# Patient Record
Sex: FEMALE | Race: WHITE | HISPANIC OR LATINO | Employment: UNEMPLOYED | URBAN - METROPOLITAN AREA
[De-identification: names, ages, dates, MRNs, and addresses within clinical notes are randomized per-mention and may not be internally consistent; named-entity substitution may affect disease eponyms.]

---

## 2022-12-29 ENCOUNTER — INFUSION (OUTPATIENT)
Dept: INFUSION THERAPY | Facility: HOSPITAL | Age: 22
End: 2022-12-29
Attending: OBSTETRICS & GYNECOLOGY
Payer: MEDICAID

## 2022-12-29 VITALS
BODY MASS INDEX: 26.57 KG/M2 | HEIGHT: 63 IN | SYSTOLIC BLOOD PRESSURE: 135 MMHG | DIASTOLIC BLOOD PRESSURE: 73 MMHG | TEMPERATURE: 98 F | OXYGEN SATURATION: 98 % | WEIGHT: 149.94 LBS | RESPIRATION RATE: 18 BRPM | HEART RATE: 90 BPM

## 2022-12-29 DIAGNOSIS — Z23 NEED FOR HPV VACCINE: Primary | ICD-10-CM

## 2022-12-29 DIAGNOSIS — Z23 NEED FOR HPV VACCINE: ICD-10-CM

## 2022-12-29 PROCEDURE — 63600175 PHARM REV CODE 636 W HCPCS: Performed by: OBSTETRICS & GYNECOLOGY

## 2022-12-29 PROCEDURE — 90651 9VHPV VACCINE 2/3 DOSE IM: CPT | Performed by: OBSTETRICS & GYNECOLOGY

## 2022-12-29 PROCEDURE — 90471 IMMUNIZATION ADMIN: CPT | Performed by: OBSTETRICS & GYNECOLOGY

## 2022-12-29 PROCEDURE — 96372 THER/PROPH/DIAG INJ SC/IM: CPT

## 2022-12-29 RX ADMIN — HUMAN PAPILLOMAVIRUS 9-VALENT VACCINE, RECOMBINANT 0.5 ML: 30; 40; 60; 40; 20; 20; 20; 20; 20 INJECTION, SUSPENSION INTRAMUSCULAR at 11:12

## 2023-02-23 ENCOUNTER — INFUSION (OUTPATIENT)
Dept: INFUSION THERAPY | Facility: HOSPITAL | Age: 23
End: 2023-02-23
Attending: OBSTETRICS & GYNECOLOGY
Payer: MEDICAID

## 2023-02-23 VITALS
TEMPERATURE: 99 F | WEIGHT: 149.94 LBS | HEIGHT: 63 IN | RESPIRATION RATE: 18 BRPM | DIASTOLIC BLOOD PRESSURE: 68 MMHG | SYSTOLIC BLOOD PRESSURE: 120 MMHG | OXYGEN SATURATION: 100 % | HEART RATE: 73 BPM | BODY MASS INDEX: 26.57 KG/M2

## 2023-02-23 DIAGNOSIS — Z23 NEED FOR HPV VACCINE: Primary | ICD-10-CM

## 2023-02-23 PROCEDURE — 90471 IMMUNIZATION ADMIN: CPT | Performed by: OBSTETRICS & GYNECOLOGY

## 2023-02-23 PROCEDURE — 90651 9VHPV VACCINE 2/3 DOSE IM: CPT | Performed by: OBSTETRICS & GYNECOLOGY

## 2023-02-23 PROCEDURE — 96372 THER/PROPH/DIAG INJ SC/IM: CPT

## 2023-02-23 PROCEDURE — 63600175 PHARM REV CODE 636 W HCPCS: Performed by: OBSTETRICS & GYNECOLOGY

## 2023-02-23 RX ADMIN — HUMAN PAPILLOMAVIRUS 9-VALENT VACCINE, RECOMBINANT 0.5 ML: 30; 40; 60; 40; 20; 20; 20; 20; 20 INJECTION, SUSPENSION INTRAMUSCULAR at 11:02

## 2023-04-21 ENCOUNTER — HOSPITAL ENCOUNTER (EMERGENCY)
Facility: HOSPITAL | Age: 23
Discharge: HOME | End: 2023-04-21
Attending: INTERNAL MEDICINE
Payer: MEDICAID

## 2023-04-21 VITALS
TEMPERATURE: 99 F | WEIGHT: 152.88 LBS | SYSTOLIC BLOOD PRESSURE: 125 MMHG | RESPIRATION RATE: 18 BRPM | HEART RATE: 84 BPM | OXYGEN SATURATION: 100 % | HEIGHT: 65 IN | BODY MASS INDEX: 25.47 KG/M2 | DIASTOLIC BLOOD PRESSURE: 81 MMHG

## 2023-04-21 DIAGNOSIS — N83.299 OVARIAN CYST, COMPLEX: Primary | ICD-10-CM

## 2023-04-21 DIAGNOSIS — N93.8 DYSFUNCTIONAL UTERINE BLEEDING: ICD-10-CM

## 2023-04-21 LAB
ALBUMIN SERPL-MCNC: 4.3 G/DL (ref 3.5–5)
ALBUMIN/GLOB SERPL: 1.4 RATIO (ref 1.1–2)
ALP SERPL-CCNC: 82 UNIT/L (ref 40–150)
ALT SERPL-CCNC: 15 UNIT/L (ref 0–55)
APPEARANCE UR: CLEAR
AST SERPL-CCNC: 13 UNIT/L (ref 5–34)
B-HCG UR QL: NEGATIVE
BACTERIA #/AREA URNS AUTO: ABNORMAL /HPF
BASOPHILS # BLD AUTO: 0.03 X10(3)/MCL (ref 0–0.2)
BASOPHILS NFR BLD AUTO: 0.3 %
BILIRUB UR QL STRIP.AUTO: NEGATIVE MG/DL
BILIRUBIN DIRECT+TOT PNL SERPL-MCNC: 0.2 MG/DL
BUN SERPL-MCNC: 9.3 MG/DL (ref 7–18.7)
C TRACH DNA SPEC QL NAA+PROBE: NOT DETECTED
CALCIUM SERPL-MCNC: 9.1 MG/DL (ref 8.4–10.2)
CHLORIDE SERPL-SCNC: 107 MMOL/L (ref 98–107)
CO2 SERPL-SCNC: 25 MMOL/L (ref 22–29)
COLOR UR AUTO: ABNORMAL
CREAT SERPL-MCNC: 0.71 MG/DL (ref 0.55–1.02)
CTP QC/QA: YES
EOSINOPHIL # BLD AUTO: 0.12 X10(3)/MCL (ref 0–0.9)
EOSINOPHIL NFR BLD AUTO: 1.2 %
ERYTHROCYTE [DISTWIDTH] IN BLOOD BY AUTOMATED COUNT: 13.2 % (ref 11.5–17)
GFR SERPLBLD CREATININE-BSD FMLA CKD-EPI: >60 MLS/MIN/1.73/M2
GLOBULIN SER-MCNC: 3.1 GM/DL (ref 2.4–3.5)
GLUCOSE SERPL-MCNC: 84 MG/DL (ref 74–100)
GLUCOSE UR QL STRIP.AUTO: NORMAL MG/DL
HCT VFR BLD AUTO: 38.3 % (ref 37–47)
HGB BLD-MCNC: 12.8 G/DL (ref 12–16)
HYALINE CASTS #/AREA URNS LPF: ABNORMAL /LPF
IMM GRANULOCYTES # BLD AUTO: 0.04 X10(3)/MCL (ref 0–0.04)
IMM GRANULOCYTES NFR BLD AUTO: 0.4 %
KETONES UR QL STRIP.AUTO: NEGATIVE MG/DL
LEUKOCYTE ESTERASE UR QL STRIP.AUTO: 25 UNIT/L
LYMPHOCYTES # BLD AUTO: 3.15 X10(3)/MCL (ref 0.6–4.6)
LYMPHOCYTES NFR BLD AUTO: 30.8 %
MCH RBC QN AUTO: 28.8 PG (ref 27–31)
MCHC RBC AUTO-ENTMCNC: 33.4 G/DL (ref 33–36)
MCV RBC AUTO: 86.1 FL (ref 80–94)
MONOCYTES # BLD AUTO: 0.73 X10(3)/MCL (ref 0.1–1.3)
MONOCYTES NFR BLD AUTO: 7.1 %
MUCOUS THREADS URNS QL MICRO: ABNORMAL /LPF
N GONORRHOEA DNA SPEC QL NAA+PROBE: NOT DETECTED
NEUTROPHILS # BLD AUTO: 6.16 X10(3)/MCL (ref 2.1–9.2)
NEUTROPHILS NFR BLD AUTO: 60.2 %
NITRITE UR QL STRIP.AUTO: NEGATIVE
NRBC BLD AUTO-RTO: 0 %
PH UR STRIP.AUTO: 6.5 [PH]
PLATELET # BLD AUTO: 142 X10(3)/MCL (ref 130–400)
PMV BLD AUTO: 12.4 FL (ref 7.4–10.4)
POTASSIUM SERPL-SCNC: 3.8 MMOL/L (ref 3.5–5.1)
PROT SERPL-MCNC: 7.4 GM/DL (ref 6.4–8.3)
PROT UR QL STRIP.AUTO: ABNORMAL MG/DL
RBC # BLD AUTO: 4.45 X10(6)/MCL (ref 4.2–5.4)
RBC #/AREA URNS AUTO: ABNORMAL /HPF
RBC UR QL AUTO: ABNORMAL UNIT/L
SODIUM SERPL-SCNC: 139 MMOL/L (ref 136–145)
SP GR UR STRIP.AUTO: 1.03
SQUAMOUS #/AREA URNS LPF: ABNORMAL /HPF
UROBILINOGEN UR STRIP-ACNC: NORMAL MG/DL
WBC # SPEC AUTO: 10.2 X10(3)/MCL (ref 4.5–11.5)
WBC #/AREA URNS AUTO: ABNORMAL /HPF

## 2023-04-21 PROCEDURE — 87591 N.GONORRHOEAE DNA AMP PROB: CPT | Performed by: PHYSICIAN ASSISTANT

## 2023-04-21 PROCEDURE — 80053 COMPREHEN METABOLIC PANEL: CPT | Performed by: PHYSICIAN ASSISTANT

## 2023-04-21 PROCEDURE — 85025 COMPLETE CBC W/AUTO DIFF WBC: CPT | Performed by: PHYSICIAN ASSISTANT

## 2023-04-21 PROCEDURE — 96372 THER/PROPH/DIAG INJ SC/IM: CPT | Performed by: PHYSICIAN ASSISTANT

## 2023-04-21 PROCEDURE — 99285 EMERGENCY DEPT VISIT HI MDM: CPT | Mod: 25

## 2023-04-21 PROCEDURE — 81025 URINE PREGNANCY TEST: CPT | Performed by: PHYSICIAN ASSISTANT

## 2023-04-21 PROCEDURE — 63600175 PHARM REV CODE 636 W HCPCS: Performed by: PHYSICIAN ASSISTANT

## 2023-04-21 PROCEDURE — 81001 URINALYSIS AUTO W/SCOPE: CPT | Performed by: PHYSICIAN ASSISTANT

## 2023-04-21 RX ORDER — KETOROLAC TROMETHAMINE 30 MG/ML
30 INJECTION, SOLUTION INTRAMUSCULAR; INTRAVENOUS
Status: COMPLETED | OUTPATIENT
Start: 2023-04-21 | End: 2023-04-21

## 2023-04-21 RX ORDER — IBUPROFEN 800 MG/1
800 TABLET ORAL 3 TIMES DAILY PRN
Qty: 30 TABLET | Refills: 0 | Status: SHIPPED | OUTPATIENT
Start: 2023-04-21

## 2023-04-21 RX ADMIN — KETOROLAC TROMETHAMINE 30 MG: 30 INJECTION, SOLUTION INTRAMUSCULAR; INTRAVENOUS at 02:04

## 2023-04-21 NOTE — Clinical Note
"William Fishia" Rangel was seen and treated in our emergency department on 4/21/2023.  She may return to work on 04/22/2023.       If you have any questions or concerns, please don't hesitate to call.      VINCENT Bourgeois RN    "

## 2023-04-21 NOTE — DISCHARGE INSTRUCTIONS
[place] los medicamentos según lo prescrito con alimentos y agua según sea necesario para el dolor.    Resultados de la ecografía: el DIU se encuentra a lo richa del canal endometrial.    Quiste levemente complejo de 4,2 cm en el ovario derecho    Se recomienda un seguimiento de 6 a 12 semanas con ecografía pélvica para garantizar la resolución.    Llame a davis obstetra ginecólogo hoy e infórmele sobre el diagnóstico. Jocelyne un seguimiento con ellos según davis recomendación.    Regrese de inmediato con cualquier empeoramiento o síntomas preocupantes.    Take medication as prescribed with food and water as needed for pain.     US results:IUD lies along endometrial canal.    4.2 cm mildly complex cyst in the right ovary     6-12 week follow up with pelvic US recommended to ensure resolution.      Call your OB GYN today and inform them of diagnosis.  Follow up with them per their recommendation.      Return immediately with any worsening or concerning symptoms.

## 2023-04-21 NOTE — ED PROVIDER NOTES
Encounter Date: 4/21/2023       History     Chief Complaint   Patient presents with    Vaginal Bleeding     IUD placed 30 days ago, has been bleeding x 10 days with abd pain and cramping.       Patient is a 22 year old female who presents to the emergency department with complaints of vaginal bleeding x 10 days with bi lower abdominal pain/cramping.   IUD placed 30 days ago, with intermittent vaginal bleeding that began 5 days after placement.  She states bleeding is mild, only using 1 panty liner daily.  She reports the pain to be bilateral (worse on right), intermittent (lasting about 15 minutes) with the most pain at night.  She rates the pain 10/10 and takes ibuprofen occasionally with some relief.  Her OB GYN is Dr. Blayne Parra in [place].  She denies fever, chills, vaginal discharge, dizziness, vision changes, headache, shortness of breath, chest pain, constipation, nausea, vomiting.    The history is provided by the patient. A  was used.   Vaginal Bleeding  This is a new problem. Episode onset: 10 days ago. The problem has not changed since onset.Associated symptoms include abdominal pain (bi lower). Pertinent negatives include no chest pain, no headaches and no shortness of breath. Nothing aggravates the symptoms. The symptoms are relieved by NSAIDs.   Review of patient's allergies indicates:  No Known Allergies  No past medical history on file.  Past Surgical History:   Procedure Laterality Date    TONSILLECTOMY       No family history on file.  Social History     Tobacco Use    Smoking status: Never    Smokeless tobacco: Never     Review of Systems   Constitutional:  Negative for chills and fever.   HENT: Negative.     Eyes: Negative.    Respiratory:  Negative for cough, chest tightness and shortness of breath.    Cardiovascular:  Negative for chest pain, palpitations and leg swelling.   Gastrointestinal:  Positive for abdominal pain (bi lower). Negative for diarrhea, nausea and  vomiting.   Genitourinary:  Positive for vaginal bleeding. Negative for decreased urine volume, difficulty urinating, dysuria, flank pain, hematuria and vaginal discharge.   Musculoskeletal:  Negative for back pain.   Skin:  Negative for rash and wound.   Neurological:  Negative for dizziness, light-headedness and headaches.     Physical Exam     Initial Vitals [04/21/23 1048]   BP Pulse Resp Temp SpO2   125/81 84 18 98.8 °F (37.1 °C) 100 %      MAP       --         Physical Exam    Nursing note and vitals reviewed.  Constitutional: She appears well-developed and well-nourished.   HENT:   Head: Normocephalic and atraumatic.   Nose: Nose normal.   Mouth/Throat: Oropharynx is clear and moist.   Eyes: Conjunctivae are normal.   Neck: Neck supple.   Normal range of motion.  Cardiovascular:  Normal rate, regular rhythm, normal heart sounds and intact distal pulses.           Pulmonary/Chest: Breath sounds normal.   Abdominal: Abdomen is soft. Bowel sounds are normal. There is no abdominal tenderness. There is no rebound and no guarding.   Genitourinary:    Genitourinary Comments: Patient declined exam     Musculoskeletal:         General: Normal range of motion.      Cervical back: Normal range of motion and neck supple.     Neurological: She is alert.   Skin: Skin is warm. Capillary refill takes less than 2 seconds.       ED Course   Procedures  Labs Reviewed   URINALYSIS, REFLEX TO URINE CULTURE - Abnormal; Notable for the following components:       Result Value    Protein, UA Trace (*)     Blood, UA 3+ (*)     Leukocyte Esterase, UA 25 (*)     WBC, UA 6-10 (*)     Squamous Epithelial Cells, UA Occ (*)     Mucous, UA Trace (*)     RBC, UA 11-20 (*)     All other components within normal limits   CBC WITH DIFFERENTIAL - Abnormal; Notable for the following components:    MPV 12.4 (*)     All other components within normal limits   CHLAMYDIA/GONORRHOEAE(GC), PCR - Normal    Narrative:     The Xpert CT/NG test, performed  on the GeneXpert system is a qualitative in vitro real-time polymerase chain reaction (PCR) test for the automated detected and differentiation for genomic DNA from Chlamydia trachomatis (CT) and/or Neisseria gonorrhoeae (NG).   CBC W/ AUTO DIFFERENTIAL    Narrative:     The following orders were created for panel order CBC Auto Differential.  Procedure                               Abnormality         Status                     ---------                               -----------         ------                     CBC with Differential[[id number]]        Abnormal            Final result                 Please view results for these tests on the individual orders.   COMPREHENSIVE METABOLIC PANEL   POCT URINE PREGNANCY          Imaging Results              US Pelvis Comp with Transvag NON-OB (xpd (Final result)  Result time 04/21/23 13:26:10      Final result by Iglesia Cruz MD (04/21/23 13:26:10)                   Impression:      1. IUD lies along the endometrial canal.  2. 4.2 cm mildly complex cyst in the right ovary.  6-12 week follow-up pelvic ultrasound recommended to ensure resolution.      Electronically signed by: Iglesia Cruz  Date:    04/21/2023  Time:    13:26               Narrative:    EXAMINATION:  US PELVIS COMP WITH TRANSVAG NON-OB (XPD)    CLINICAL HISTORY:  heavy bleeding after placement of IUD, abdominal pain;    TECHNIQUE:  Transabdominal and endovaginal ultrasound of the pelvis.    COMPARISON:  No relevant comparison studies available at the time of dictation    FINDINGS:  Uterus has normal size with no defined myometrial lesion.  IUD identified along the endometrial canal.    Right ovary contains a 4.2 cm cyst which has 1 or 2 few possible peripheral septations.  Left ovary has normal appearance.  Blood flow documented within both ovaries.    No free fluid.    Measurements:    Uterus: 6.6 x 2.9 x 3.8 cm    Endometrium: 4 mm thick    Right ovary: 4.4 x 3.3 x 4.4 cm    Left ovary: 2.8 x 1.1 x  1.9 cm                                       Medications   ketorolac injection 30 mg (30 mg Intramuscular Given 4/21/23 8763)     Medical Decision Making:   Initial Assessment:   Patient is a 22 year old female who presents to the emergency department with complaints of vaginal bleeding x 10 days with bi lower abdominal pain/cramping.   Differential Diagnosis:   Ovarian cysts, ovarian torsion, UTI, STI, constipation  Clinical Tests:   Lab Tests: Reviewed and Ordered       <> Summary of Lab: Unremarkable  Radiological Study: Ordered and Reviewed  ED Management:  Medication given:  30 mg IM    US pelvis non OB:  IUD lies along the endometrial canal. 4.2 cm mildly complex cyst in the right ovary.  6-12 week follow-up pelvic ultrasound recommended to ensure resolution.    Patient will contact her OB GYN, Dr. Blayne Parra for inform of symptoms and findings and schedule follow up per their recommendation.  Gave strict ED precautions.      The patient is resting comfortably and in no acute distress.  She states that her symptoms have improved after treatment in Emergency Department. I personally discussed her test results and treatment plan.  Gave strict ED precautions, discussed specific conditions for return to the emergency department and importance of follow up with her primary care provider.  Patient voices understanding and agrees to the plan discussed. All patients' questions have been answered at this time.   She has remained hemodynamically stable throughout entire stay in ED and is stable for discharge home.             ED Course as of 04/24/23 0747   Fri Apr 21, 2023   1223 Leukocytes, UA(!): 25 [ER]   1223 WBC, UA(!): 6-10 [ER]   1338 US Pelvis Comp with Transvag NON-OB (xpd  1. IUD lies along the endometrial canal.  2. 4.2 cm mildly complex cyst in the right ovary.  6-12 week follow-up pelvic ultrasound recommended to ensure resolution. [ER]      ED Course User Index  [ER] ADONAY Echeverria                  Clinical Impression:   Final diagnoses:  [N83.299] Ovarian cyst, complex (Primary)  [N93.8] Dysfunctional uterine bleeding        ED Disposition Condition    Discharge Stable          ED Prescriptions       Medication Sig Dispense Start Date End Date Auth. Provider    ibuprofen (ADVIL,MOTRIN) 800 MG tablet Take 1 tablet (800 mg total) by mouth 3 (three) times daily as needed for Pain. 30 tablet 4/21/2023 -- ADONAY Echeverria          Follow-up Information       Follow up With Specialties Details Why Contact Info    [facility] Emergency Medicine  As needed, If symptoms worsen [address] [facility] [id number] [phone]             ADONAY Echeverria  04/24/23 0747       ADONAY Echeverria  04/24/23 0714

## 2023-05-01 ENCOUNTER — HOSPITAL ENCOUNTER (EMERGENCY)
Facility: HOSPITAL | Age: 23
Discharge: HOME | End: 2023-05-01
Attending: INTERNAL MEDICINE
Payer: MEDICAID

## 2023-05-01 VITALS
BODY MASS INDEX: 25.18 KG/M2 | DIASTOLIC BLOOD PRESSURE: 86 MMHG | OXYGEN SATURATION: 99 % | WEIGHT: 151.13 LBS | RESPIRATION RATE: 18 BRPM | TEMPERATURE: 98 F | SYSTOLIC BLOOD PRESSURE: 121 MMHG | HEART RATE: 96 BPM | HEIGHT: 65 IN

## 2023-05-01 DIAGNOSIS — T83.83XD: Primary | ICD-10-CM

## 2023-05-01 PROCEDURE — 99282 EMERGENCY DEPT VISIT SF MDM: CPT

## 2023-05-01 NOTE — Clinical Note
"William Fishia" Rangel was seen and treated in our emergency department on 5/1/2023.  She may return to work on 05/02/2023.       If you have any questions or concerns, please don't hesitate to call.      Dr. Cowan RN    "

## 2023-05-02 NOTE — ED PROVIDER NOTES
Encounter Date: 5/1/2023       History     Chief Complaint   Patient presents with    Vaginal Bleeding     Had IUD placed 2 months ago, today bleeding and abd pain      Presents for IUD removal.  was placed by Dr. Ny at [facility] 2 months ago but is making her hurt and bleed. She has an appointment with him on may 17.  was evaluated before in ED had an US reveal a small cyst in her ovary also. States using Ibuprofen for the pain    The history is provided by the patient.   Review of patient's allergies indicates:  No Known Allergies  No past medical history on file.  Past Surgical History:   Procedure Laterality Date    TONSILLECTOMY       No family history on file.  Social History     Tobacco Use    Smoking status: Never    Smokeless tobacco: Never     Review of Systems   Constitutional:  Negative for fever.   HENT:  Negative for sore throat.    Respiratory:  Negative for shortness of breath.    Cardiovascular:  Negative for chest pain.   Gastrointestinal:  Negative for nausea.   Genitourinary:  Positive for menstrual problem, pelvic pain and vaginal bleeding. Negative for dysuria.   Musculoskeletal:  Negative for back pain.   Skin:  Negative for rash.   Neurological:  Negative for weakness.   Hematological:  Does not bruise/bleed easily.   All other systems reviewed and are negative.    Physical Exam     Initial Vitals [05/01/23 2018]   BP Pulse Resp Temp SpO2   121/86 96 18 97.7 °F (36.5 °C) 99 %      MAP       --         Physical Exam    Nursing note and vitals reviewed.  Constitutional: She appears well-developed and well-nourished. No distress.   HENT:   Head: Normocephalic and atraumatic.   Mouth/Throat: Oropharynx is clear and moist.   Eyes: Conjunctivae are normal. Pupils are equal, round, and reactive to light.   Neck:   Normal range of motion.  Cardiovascular:  Normal rate, regular rhythm, normal heart sounds and intact distal pulses.           Pulmonary/Chest: Breath sounds normal.    Abdominal: Abdomen is soft. Bowel sounds are normal. She exhibits no distension. There is no abdominal tenderness. There is no rebound and no guarding.   Musculoskeletal:         General: No edema. Normal range of motion.      Cervical back: Normal range of motion.     Neurological: She is alert and oriented to person, place, and time. She has normal strength. GCS score is 15. GCS eye subscore is 4. GCS verbal subscore is 5. GCS motor subscore is 6.   Skin: Skin is warm and dry. No rash noted.   Psychiatric: Her behavior is normal.       ED Course   Procedures  Labs Reviewed - No data to display       Imaging Results    None          Medications - No data to display  Medical Decision Making:   History:   Old Medical Records: I decided to obtain old medical records.  Old Records Summarized: records from clinic visits.  Initial Assessment:   heavy bleeding after placement of IUD, abdominal pain;     TECHNIQUE:  Transabdominal and endovaginal ultrasound of the pelvis.     COMPARISON:  No relevant comparison studies available at the time of dictation     FINDINGS:  Uterus has normal size with no defined myometrial lesion.  IUD identified along the endometrial canal.     Right ovary contains a 4.2 cm cyst which has 1 or 2 few possible peripheral septations.  Left ovary has normal appearance.  Blood flow documented within both ovaries.     No free fluid.     Measurements:     Uterus: 6.6 x 2.9 x 3.8 cm     Endometrium: 4 mm thick     Right ovary: 4.4 x 3.3 x 4.4 cm     Left ovary: 2.8 x 1.1 x 1.9 cm     Impression:     1. IUD lies along the endometrial canal.  2. 4.2 cm mildly complex cyst in the right ovary.  6-12 week follow-up pelvic ultrasound recommended to ensure resolution.        Electronically signed by: Iglesia Cruz  Date:                                            04/21/2023  Time:                                           13:26                 8:24 PM    Pt states she does not want any workup, she thought  IUD could be removed in ED and she will not need to wait until her appointment. Requesting discharge       Clinical Impression:   Final diagnoses:  [T83.83XD] Hemorrhage due to intrauterine contraceptive device (IUD), subsequent encounter (Primary)        ED Disposition Condition    Discharge Stable          ED Prescriptions    None       Follow-up Information       Follow up With Specialties Details Why Contact Info    Geo Potter MD Geriatric Medicine In 2 weeks  [address] [facility] [address]  [phone]      [facility] Emergency Medicine  If symptoms worsen [facility] [facility] [id number] [phone]             Yusef Ambriz MD  05/01/23 2026

## 2023-05-18 DIAGNOSIS — Z01.818 OTHER SPECIFIED PRE-OPERATIVE EXAMINATION: Primary | ICD-10-CM

## 2023-05-22 ENCOUNTER — HOSPITAL ENCOUNTER (OUTPATIENT)
Dept: RADIOLOGY | Facility: HOSPITAL | Age: 23
Discharge: HOME | End: 2023-05-22
Attending: OBSTETRICS & GYNECOLOGY | Admitting: OBSTETRICS & GYNECOLOGY
Payer: MEDICAID

## 2023-05-22 DIAGNOSIS — Z01.818 OTHER SPECIFIED PRE-OPERATIVE EXAMINATION: ICD-10-CM

## 2023-05-22 PROCEDURE — 71046 X-RAY EXAM CHEST 2 VIEWS: CPT | Mod: TC

## 2023-05-24 ENCOUNTER — HOSPITAL ENCOUNTER (OUTPATIENT)
Facility: HOSPITAL | Age: 23
Discharge: HOME | End: 2023-05-24
Attending: OBSTETRICS & GYNECOLOGY | Admitting: OBSTETRICS & GYNECOLOGY
Payer: MEDICAID

## 2023-05-24 ENCOUNTER — ANESTHESIA (OUTPATIENT)
Dept: SURGERY | Facility: HOSPITAL | Age: 23
End: 2023-05-24
Payer: MEDICAID

## 2023-05-24 ENCOUNTER — ANESTHESIA EVENT (OUTPATIENT)
Dept: SURGERY | Facility: HOSPITAL | Age: 23
End: 2023-05-24
Payer: MEDICAID

## 2023-05-24 DIAGNOSIS — Z97.5 PRESENCE OF INTRACERVICAL CONTRACEPTIVE DEVICE: ICD-10-CM

## 2023-05-24 DIAGNOSIS — Z30.40 CONTRACEPTION, GENERIC SURVEILLANCE: ICD-10-CM

## 2023-05-24 LAB
B-HCG UR QL: NEGATIVE
CTP QC/QA: YES

## 2023-05-24 PROCEDURE — 37000008 HC ANESTHESIA 1ST 15 MINUTES: Performed by: OBSTETRICS & GYNECOLOGY

## 2023-05-24 PROCEDURE — 81025 URINE PREGNANCY TEST: CPT | Performed by: OBSTETRICS & GYNECOLOGY

## 2023-05-24 PROCEDURE — 25000003 PHARM REV CODE 250: Performed by: NURSE ANESTHETIST, CERTIFIED REGISTERED

## 2023-05-24 PROCEDURE — 36000707: Performed by: OBSTETRICS & GYNECOLOGY

## 2023-05-24 PROCEDURE — D9220A PRA ANESTHESIA: ICD-10-PCS | Mod: ,,, | Performed by: NURSE ANESTHETIST, CERTIFIED REGISTERED

## 2023-05-24 PROCEDURE — 88304 TISSUE EXAM BY PATHOLOGIST: CPT | Performed by: OBSTETRICS & GYNECOLOGY

## 2023-05-24 PROCEDURE — 36000706: Performed by: OBSTETRICS & GYNECOLOGY

## 2023-05-24 PROCEDURE — 71000033 HC RECOVERY, INTIAL HOUR: Performed by: OBSTETRICS & GYNECOLOGY

## 2023-05-24 PROCEDURE — 37000009 HC ANESTHESIA EA ADD 15 MINS: Performed by: OBSTETRICS & GYNECOLOGY

## 2023-05-24 PROCEDURE — D9220A PRA ANESTHESIA: Mod: ,,, | Performed by: NURSE ANESTHETIST, CERTIFIED REGISTERED

## 2023-05-24 PROCEDURE — 71000016 HC POSTOP RECOV ADDL HR: Performed by: OBSTETRICS & GYNECOLOGY

## 2023-05-24 PROCEDURE — 63600175 PHARM REV CODE 636 W HCPCS: Performed by: ANESTHESIOLOGY

## 2023-05-24 PROCEDURE — 25000003 PHARM REV CODE 250

## 2023-05-24 PROCEDURE — 63600175 PHARM REV CODE 636 W HCPCS: Performed by: NURSE ANESTHETIST, CERTIFIED REGISTERED

## 2023-05-24 PROCEDURE — 71000015 HC POSTOP RECOV 1ST HR: Performed by: OBSTETRICS & GYNECOLOGY

## 2023-05-24 PROCEDURE — 63600175 PHARM REV CODE 636 W HCPCS

## 2023-05-24 PROCEDURE — 25000003 PHARM REV CODE 250: Performed by: OBSTETRICS & GYNECOLOGY

## 2023-05-24 RX ORDER — HYDROCODONE BITARTRATE AND ACETAMINOPHEN 5; 325 MG/1; MG/1
1 TABLET ORAL EVERY 4 HOURS PRN
Status: DISCONTINUED | OUTPATIENT
Start: 2023-05-24 | End: 2023-05-24 | Stop reason: HOSPADM

## 2023-05-24 RX ORDER — SODIUM CHLORIDE, SODIUM GLUCONATE, SODIUM ACETATE, POTASSIUM CHLORIDE AND MAGNESIUM CHLORIDE 30; 37; 368; 526; 502 MG/100ML; MG/100ML; MG/100ML; MG/100ML; MG/100ML
INJECTION, SOLUTION INTRAVENOUS CONTINUOUS
Status: DISCONTINUED | OUTPATIENT
Start: 2023-05-24 | End: 2023-05-24 | Stop reason: HOSPADM

## 2023-05-24 RX ORDER — ONDANSETRON 2 MG/ML
4 INJECTION INTRAMUSCULAR; INTRAVENOUS DAILY PRN
Status: DISCONTINUED | OUTPATIENT
Start: 2023-05-24 | End: 2023-05-24 | Stop reason: HOSPADM

## 2023-05-24 RX ORDER — PROPOFOL 10 MG/ML
VIAL (ML) INTRAVENOUS
Status: DISCONTINUED | OUTPATIENT
Start: 2023-05-24 | End: 2023-05-24

## 2023-05-24 RX ORDER — HYDROMORPHONE HYDROCHLORIDE 2 MG/ML
0.2 INJECTION, SOLUTION INTRAMUSCULAR; INTRAVENOUS; SUBCUTANEOUS EVERY 5 MIN PRN
Status: DISCONTINUED | OUTPATIENT
Start: 2023-05-24 | End: 2023-05-24

## 2023-05-24 RX ORDER — SCOLOPAMINE TRANSDERMAL SYSTEM 1 MG/1
1 PATCH, EXTENDED RELEASE TRANSDERMAL
Status: DISCONTINUED | OUTPATIENT
Start: 2023-05-24 | End: 2023-05-24 | Stop reason: HOSPADM

## 2023-05-24 RX ORDER — LIDOCAINE HYDROCHLORIDE AND EPINEPHRINE 5; 5 MG/ML; UG/ML
INJECTION, SOLUTION INFILTRATION; PERINEURAL
Status: DISCONTINUED
Start: 2023-05-24 | End: 2023-05-24 | Stop reason: WASHOUT

## 2023-05-24 RX ORDER — PROCHLORPERAZINE EDISYLATE 5 MG/ML
5 INJECTION INTRAMUSCULAR; INTRAVENOUS EVERY 30 MIN PRN
Status: DISCONTINUED | OUTPATIENT
Start: 2023-05-24 | End: 2023-05-24 | Stop reason: HOSPADM

## 2023-05-24 RX ORDER — LIDOCAINE HYDROCHLORIDE 10 MG/ML
INJECTION, SOLUTION EPIDURAL; INFILTRATION; INTRACAUDAL; PERINEURAL
Status: DISCONTINUED | OUTPATIENT
Start: 2023-05-24 | End: 2023-05-24

## 2023-05-24 RX ORDER — HYDROMORPHONE HYDROCHLORIDE 2 MG/ML
0.4 INJECTION, SOLUTION INTRAMUSCULAR; INTRAVENOUS; SUBCUTANEOUS EVERY 5 MIN PRN
Status: DISCONTINUED | OUTPATIENT
Start: 2023-05-24 | End: 2023-05-24 | Stop reason: HOSPADM

## 2023-05-24 RX ORDER — FENTANYL CITRATE 50 UG/ML
INJECTION, SOLUTION INTRAMUSCULAR; INTRAVENOUS
Status: DISCONTINUED | OUTPATIENT
Start: 2023-05-24 | End: 2023-05-24

## 2023-05-24 RX ORDER — MEPERIDINE HYDROCHLORIDE 25 MG/ML
12.5 INJECTION INTRAMUSCULAR; INTRAVENOUS; SUBCUTANEOUS EVERY 10 MIN PRN
Status: DISCONTINUED | OUTPATIENT
Start: 2023-05-24 | End: 2023-05-24 | Stop reason: HOSPADM

## 2023-05-24 RX ORDER — LIDOCAINE HYDROCHLORIDE 10 MG/ML
1 INJECTION, SOLUTION EPIDURAL; INFILTRATION; INTRACAUDAL; PERINEURAL ONCE
Status: DISCONTINUED | OUTPATIENT
Start: 2023-05-24 | End: 2023-05-24 | Stop reason: HOSPADM

## 2023-05-24 RX ORDER — SODIUM CHLORIDE, SODIUM LACTATE, POTASSIUM CHLORIDE, CALCIUM CHLORIDE 600; 310; 30; 20 MG/100ML; MG/100ML; MG/100ML; MG/100ML
INJECTION, SOLUTION INTRAVENOUS CONTINUOUS
Status: DISCONTINUED | OUTPATIENT
Start: 2023-05-24 | End: 2023-05-24 | Stop reason: HOSPADM

## 2023-05-24 RX ORDER — SCOLOPAMINE TRANSDERMAL SYSTEM 1 MG/1
PATCH, EXTENDED RELEASE TRANSDERMAL
Status: DISCONTINUED
Start: 2023-05-24 | End: 2023-05-24 | Stop reason: HOSPADM

## 2023-05-24 RX ORDER — PROCHLORPERAZINE EDISYLATE 5 MG/ML
5 INJECTION INTRAMUSCULAR; INTRAVENOUS EVERY 6 HOURS PRN
Status: DISCONTINUED | OUTPATIENT
Start: 2023-05-24 | End: 2023-05-24 | Stop reason: HOSPADM

## 2023-05-24 RX ORDER — DIPHENHYDRAMINE HCL 25 MG
25 CAPSULE ORAL EVERY 4 HOURS PRN
Status: DISCONTINUED | OUTPATIENT
Start: 2023-05-24 | End: 2023-05-24 | Stop reason: HOSPADM

## 2023-05-24 RX ORDER — HYDROMORPHONE HYDROCHLORIDE 2 MG/ML
0.2 INJECTION, SOLUTION INTRAMUSCULAR; INTRAVENOUS; SUBCUTANEOUS EVERY 5 MIN PRN
Status: DISCONTINUED | OUTPATIENT
Start: 2023-05-24 | End: 2023-05-24 | Stop reason: HOSPADM

## 2023-05-24 RX ORDER — MUPIROCIN 20 MG/G
OINTMENT TOPICAL
Status: DISCONTINUED | OUTPATIENT
Start: 2023-05-24 | End: 2023-05-24 | Stop reason: HOSPADM

## 2023-05-24 RX ORDER — ACETAMINOPHEN 500 MG
TABLET ORAL
Status: COMPLETED
Start: 2023-05-24 | End: 2023-05-24

## 2023-05-24 RX ORDER — SILVER NITRATE 38.21; 12.74 MG/1; MG/1
STICK TOPICAL
Status: DISCONTINUED | OUTPATIENT
Start: 2023-05-24 | End: 2023-05-24 | Stop reason: HOSPADM

## 2023-05-24 RX ORDER — SODIUM CHLORIDE 9 MG/ML
INJECTION, SOLUTION INTRAVENOUS CONTINUOUS
Status: DISCONTINUED | OUTPATIENT
Start: 2023-05-24 | End: 2023-05-24 | Stop reason: HOSPADM

## 2023-05-24 RX ORDER — SODIUM CITRATE AND CITRIC ACID MONOHYDRATE 334; 500 MG/5ML; MG/5ML
30 SOLUTION ORAL ONCE
Status: COMPLETED | OUTPATIENT
Start: 2023-05-24 | End: 2023-05-24

## 2023-05-24 RX ORDER — ONDANSETRON 4 MG/1
8 TABLET, ORALLY DISINTEGRATING ORAL EVERY 8 HOURS PRN
Status: DISCONTINUED | OUTPATIENT
Start: 2023-05-24 | End: 2023-05-24 | Stop reason: HOSPADM

## 2023-05-24 RX ORDER — FAMOTIDINE 20 MG/1
20 TABLET, FILM COATED ORAL
Status: COMPLETED | OUTPATIENT
Start: 2023-05-24 | End: 2023-05-24

## 2023-05-24 RX ORDER — MIDAZOLAM HYDROCHLORIDE 1 MG/ML
INJECTION INTRAMUSCULAR; INTRAVENOUS
Status: COMPLETED
Start: 2023-05-24 | End: 2023-05-24

## 2023-05-24 RX ORDER — SILVER NITRATE 38.21; 12.74 MG/1; MG/1
STICK TOPICAL
Status: DISCONTINUED
Start: 2023-05-24 | End: 2023-05-24 | Stop reason: HOSPADM

## 2023-05-24 RX ORDER — IBUPROFEN 600 MG/1
600 TABLET ORAL EVERY 6 HOURS PRN
Status: DISCONTINUED | OUTPATIENT
Start: 2023-05-24 | End: 2023-05-24 | Stop reason: HOSPADM

## 2023-05-24 RX ORDER — ACETAMINOPHEN 500 MG
1000 TABLET ORAL
Status: COMPLETED | OUTPATIENT
Start: 2023-05-24 | End: 2023-05-24

## 2023-05-24 RX ORDER — DIPHENHYDRAMINE HYDROCHLORIDE 50 MG/ML
25 INJECTION INTRAMUSCULAR; INTRAVENOUS EVERY 6 HOURS PRN
Status: DISCONTINUED | OUTPATIENT
Start: 2023-05-24 | End: 2023-05-24 | Stop reason: HOSPADM

## 2023-05-24 RX ORDER — DIPHENHYDRAMINE HYDROCHLORIDE 50 MG/ML
25 INJECTION INTRAMUSCULAR; INTRAVENOUS EVERY 4 HOURS PRN
Status: DISCONTINUED | OUTPATIENT
Start: 2023-05-24 | End: 2023-05-24 | Stop reason: HOSPADM

## 2023-05-24 RX ORDER — SODIUM CITRATE AND CITRIC ACID MONOHYDRATE 334; 500 MG/5ML; MG/5ML
SOLUTION ORAL
Status: COMPLETED
Start: 2023-05-24 | End: 2023-05-24

## 2023-05-24 RX ORDER — MIDAZOLAM HYDROCHLORIDE 1 MG/ML
2 INJECTION INTRAMUSCULAR; INTRAVENOUS
Status: ACTIVE | OUTPATIENT
Start: 2023-05-24 | End: 2023-05-24

## 2023-05-24 RX ADMIN — FENTANYL CITRATE 50 MCG: 50 INJECTION, SOLUTION INTRAMUSCULAR; INTRAVENOUS at 11:05

## 2023-05-24 RX ADMIN — MIDAZOLAM HYDROCHLORIDE 2 MG: 1 INJECTION INTRAMUSCULAR; INTRAVENOUS at 11:05

## 2023-05-24 RX ADMIN — Medication 1000 MG: at 11:05

## 2023-05-24 RX ADMIN — ACETAMINOPHEN 1000 MG: 500 TABLET, FILM COATED ORAL at 11:05

## 2023-05-24 RX ADMIN — LIDOCAINE HYDROCHLORIDE 50 MG: 10 INJECTION, SOLUTION EPIDURAL; INFILTRATION; INTRACAUDAL; PERINEURAL at 11:05

## 2023-05-24 RX ADMIN — SCOPOLAMINE 1 PATCH: 1 PATCH TRANSDERMAL at 12:05

## 2023-05-24 RX ADMIN — SODIUM CHLORIDE, POTASSIUM CHLORIDE, SODIUM LACTATE AND CALCIUM CHLORIDE: 600; 310; 30; 20 INJECTION, SOLUTION INTRAVENOUS at 01:05

## 2023-05-24 RX ADMIN — SODIUM CITRATE AND CITRIC ACID MONOHYDRATE 30 ML: 500; 334 SOLUTION ORAL at 11:05

## 2023-05-24 RX ADMIN — SODIUM CITRATE AND CITRIC ACID MONOHYDRATE 30 ML: 334; 500 SOLUTION ORAL at 11:05

## 2023-05-24 RX ADMIN — PROPOFOL 180 MG: 10 INJECTION, EMULSION INTRAVENOUS at 11:05

## 2023-05-24 RX ADMIN — MIDAZOLAM HYDROCHLORIDE 2 MG: 1 INJECTION, SOLUTION INTRAMUSCULAR; INTRAVENOUS at 11:05

## 2023-05-24 RX ADMIN — FAMOTIDINE 20 MG: 20 TABLET, FILM COATED ORAL at 11:05

## 2023-05-24 RX ADMIN — SCOLOPAMINE TRANSDERMAL SYSTEM 1 PATCH: 1 PATCH, EXTENDED RELEASE TRANSDERMAL at 12:05

## 2023-05-24 RX ADMIN — SODIUM CHLORIDE, POTASSIUM CHLORIDE, SODIUM LACTATE AND CALCIUM CHLORIDE: 600; 310; 30; 20 INJECTION, SOLUTION INTRAVENOUS at 11:05

## 2023-05-24 NOTE — OP NOTE
[facility]                       [address]                      [place] [address]    PATIENT NAME:      CHACORTA SALINAS   YOB: 2000  CSN:               [id number]  MRN:               [id number]  ADMIT DATE:        05/24/2023 09:58:00  PHYSICIAN:         Blayne Parra MD                          OPERATIVE REPORT      DATE OF SURGERY:    05/24/2023 00:00:00    SURGEON:  Blayne Parra MD    PREOPERATIVE DIAGNOSES:  22-year-old female with a lost intrauterine   contraceptive device, skin tag on left labia.  She wants removed.    POSTOPERATIVE DIAGNOSES:  22-year-old female with a lost intrauterine   contraceptive device, skin tag on left labia.  She wants removed.    OPERATION:  Operative hysteroscopy, removal of simple skin tag.    ANESTHESIOLOGIST:  Dr. Chandler.    ANESTHESIA:  General.    BLOOD LOSS:  Minimal.    SPECIMEN:  Labial skin tag.  Pregnancy test is negative on day of surgery.  SCDs   were placed.    DESCRIPTION OF PROCEDURE:  The risks, benefits, and alternatives to this   procedure were explained in detail to the patient.  She verbalized   understanding.  Consents were signed.  She was taken to the operating theater   with intravenous fluids running, placed on the operating room table in the   dorsal supine position, where general anesthesia was achieved without   difficulty.  SCDs were placed.  She was placed in Eloy stirrups and prepped and   draped in usual sterile fashion.  Bimanual exam was performed.  The Graves   speculum was placed.  Cervix was visualized and grasped at the 12 o'clock   position with a single-tooth tenaculum.  Leonel's dilators were used to where I   could dilate the cervix, to where I could then place a 5 mm operative   hysteroscope under direct hysteroscopic visualization.  A grasper was placed   through the operative report.  An IUD was noted, transverse in the uterine   cavity.  The  string was grasped and the scope and the IUD were removed at the   same time.  No active bleeding.  Of note, there was proliferative endometrium in   the cavity.  Tenaculum removed.  Tenaculum site fulgurated with a silver   nitrate stick.  All instruments were removed from the vagina.  I turned my   attention toward the skin tag.  It was grasped with a DeBakey pickup and using a   small Bovie, it was cauterized the base.  Hemostasis was noted.  Procedure was   deemed over.  Sponge, lap, instrument, and needle counts were correct x2.  The   patient was cleaned, awoken, taken to the recovery room, awake and stable   condition.        ______________________________  MD RANAD Kaufman/AQS  DD:  05/24/2023  Time:  01:15PM  DT:  05/24/2023  Time:  03:42PM  Job #:  [id number]      OPERATIVE REPORT

## 2023-05-24 NOTE — ANESTHESIA POSTPROCEDURE EVALUATION
Anesthesia Post Evaluation    Patient: William Multani    Procedure(s) Performed: Procedure(s) (LRB):  HYSTEROSCOPY operative (N/A)  REMOVAL, INTRAUTERINE DEVICE (N/A)  EXCISION, WART (N/A)    Final Anesthesia Type: general      Patient location during evaluation: PACU  Patient participation: No - Unable to Participate, Sedation  Level of consciousness: sedated  Post-procedure vital signs: reviewed and stable  Pain management: adequate  Airway patency: patent  PARI mitigation strategies: Multimodal analgesia  PONV status at discharge: No PONV  Anesthetic complications: no      Cardiovascular status: stable  Respiratory status: nasal cannula  Hydration status: euvolemic  Follow-up not needed.          Vitals Value Taken Time   /75 05/24/23 1041   Temp 36.9 °C (98.5 °F) 05/24/23 1041   Pulse 83 05/24/23 1040   Resp 18 05/24/23 1231   SpO2 99 % 05/24/23 1040         No case tracking events are documented in the log.      Pain/Ana Rosa Score: Pain Rating Prior to Med Admin: 0 (5/24/2023 11:20 AM)

## 2023-05-24 NOTE — H&P
[facility]                       [address] [place] [address]    PATIENT NAME:       CHACORTA SALINAS   YOB: 2000  CSN:                [id number]   MRN:                [id number]  ADMIT DATE:  PHYSICIAN:          Blayne Parra MD                        HISTORY AND PHYSICAL      HISTORY OF PRESENT ILLNESS:  Ms. Salinas is a 22-year-old Malaysian-speaking   female who had an IUD placed in February 2023.  The patient never made her   followup appointment to check the IUD placement.  The IUD was placed without   difficulty.  She also had a skin tag that was frozen off with cryotherapy and   she has another one that she would like removed.  She will be coming to   [facility] to undergo operative hysteroscopy, possible   operative laparoscopy for IUD, fulguration of a skin tag on the all.  She   understands this is a major surgery with major risk involved.  Our conversation   included, but was not limited to the risk of bleeding, infection, anesthesia.    She will be admitted to Same-Day Surgery to undergo this procedure.    ALLERGIES:  SHE HAS NO KNOWN DRUG ALLERGIES.     PAST SURGICAL HISTORY:  Notable for tonsillectomy.    SOCIAL HISTORY:  She denies alcohol, tobacco, or illicit drugs.    GYNECOLOGICAL HISTORY:  She denies.    OBSTETRICAL HISTORY:  She denies.    FAMILY HISTORY:  She denies.    REVIEW OF SYSTEMS:  Noncontributory.  She has no complaints.  She denies headaches, visual changes,   chest pain, shortness of breath, nausea, vomiting, fevers and chills.  She   denies constipation, diarrhea, sick contacts.    PHYSICAL EXAMINATION:  VITAL SIGNS:  Stable.  She is afebrile.  GENERAL:  She is alert, oriented x3.  No apparent distress.  HEART:  Regular rate and rhythm to auscultation.  LUNGS:  Clear bilateral to auscultation.  ABDOMEN:  Soft, nontender, nondistended.  No guarding or  rebound.  PELVIC EXAM:  Age-appropriate vulva and vagina.  Small skin tags noted   bilaterally.  EXTREMITIES:  Nontender.  Uterus is small.  No adnexal masses are noted.    LABORATORY DATA:  Preoperative labs and clearance was performed at the [facility].  Ultrasound performed in April showed an IUD in the endometrial canal.    String was not noted on in office visit.  Attempted removal in the office was   attempted.  Due to patient's discomfort, the procedure was aborted.    ASSESSMENT AND PLAN:  A 22-year-old with a lost IUD strings, not seen at the   cervical os, coming to [facility] for operative   hysteroscopy, possible operative laparoscopy if the IUD has migrated to the   uterine wall.  The risks, benefits, and alternatives to this procedure were   discussed in great detail to Ms. Multani via [employer] .  Over 1 hour   was taken to get these consents signed and explained to her satisfaction.  She   understands the risks of surgery.  Our conversation included, but was not   limited to, the risk of bleeding, infection, anesthesia, uncontrolled bleeding,   the need for laparotomy, the risk of DVT and blood transfusion.  She also   understands about fulgurating lesion on the vulva, there is always a chance of   scarring.  The patient verbalized understanding.  She wants to proceed.    Consents were signed.  She will be taken to [facility] to undergo this   procedure.    PLAN:   mL/hour.  Consent for operative hysteroscopy, operative   laparoscopy, ablation of .  Consent for blood to chart.  Pregnancy test on   day of surgery.        ______________________________  MD RANDA Kaufman/KATLIN  DD:  05/23/2023  Time:  03:55PM  DT:  05/23/2023  Time:  07:20PM  Job #:  [id number]      HISTORY AND PHYSICAL

## 2023-05-24 NOTE — ANESTHESIA PREPROCEDURE EVALUATION
05/24/2023  William Multani is a 22 y.o., female.    Pre-op Diagnosis: Presence of intracervical contraceptive device [Z30.9]    Procedure(s):  HYSTEROSCOPY operative  REMOVAL, INTRAUTERINE DEVICE  EXCISION, WART     Review of patient's allergies indicates:  No Known Allergies    Current Outpatient Medications   Medication Instructions    ibuprofen (ADVIL,MOTRIN) 800 mg, Oral, 3 times daily PRN    MIRENA 20 mcg/24 hours (8 yrs) 52 mg IUD No dose, route, or frequency recorded.       CT REMOVE, INTRAUTERINE DEVICE [53146] (HYSTEROSCOPY operat*    Past Medical History:   Diagnosis Date    Excessive vaginal bleeding    PMH includes GERD & MIGRAINE Headaches    Past Surgical History:   Procedure Laterality Date    TONSILLECTOMY AND ADENOIDECTOMY       POC UPT NEGATIVE  Lab Results   Component Value Date    WBC 7.56 05/22/2023    HGB 13.6 05/22/2023    HCT 41.1 05/22/2023    MCV 88.2 05/22/2023     05/22/2023   BMP  Lab Results   Component Value Date     05/22/2023    K 3.9 05/22/2023    CO2 24 05/22/2023    BUN 10.5 05/22/2023    CREATININE 0.76 05/22/2023    CALCIUM 9.3 05/22/2023   CXR FINDINGS:  Cardiopericardial silhouette is within normal limits. Lungs are without dense focal or segmental consolidation, congestion, pleural effusion or pneumothorax.     There is thoracolumbar compound scoliosis.     Impression: No acute cardiopulmonary process identified.   Electronically signed by: Ishan Aparicio  05/22/2023  10:17     Pre-op Assessment    I have reviewed the Patient Summary Reports.    I have reviewed the NPO Status.   I have reviewed the Medications.     Review of Systems  Anesthesia Hx:  No problems with previous Anesthesia  Denies Family Hx of Anesthesia complications.   Denies Personal Hx of Anesthesia complications.   Social:  Non-Smoker    Cardiovascular:   Exercise tolerance: good   Denies Angina.  Denies Orthopnea.  Denies PND.  Denies ANGEL.  Functional Capacity good / => 4 METS    Musculoskeletal:  Musculoskeletal Normal    Neurological:   Denies TIA. Denies CVA.    Psych:  Psychiatric Normal           Physical Exam  General: Well nourished, Alert and Oriented    Airway:  Mallampati: III   Mouth Opening: Normal  TM Distance: Normal  Tongue: Normal  Neck ROM: Normal ROM    Dental:  Intact    Chest/Lungs:  Clear to auscultation    Heart:  Rate: Normal  Rhythm: Regular Rhythm  No pretibial edema  No carotid bruits      Anesthesia Plan  Type of Anesthesia, risks & benefits discussed:    Anesthesia Type: Gen Supraglottic Airway  Intra-op Monitoring Plan: Standard ASA Monitors  Post Op Pain Control Plan: multimodal analgesia  Induction:  IV  Airway Plan: Direct, Post-Induction  Informed Consent: Informed consent signed with the Patient and all parties understand the risks and agree with anesthesia plan.  All questions answered. Patient consented to blood products? No  ASA Score: 2  Day of Surgery Review of History & Physical: H&P Update referred to the surgeon/provider.    Ready For Surgery From Anesthesia Perspective.     .

## 2023-05-25 VITALS
DIASTOLIC BLOOD PRESSURE: 58 MMHG | BODY MASS INDEX: 25.11 KG/M2 | HEART RATE: 54 BPM | HEIGHT: 64 IN | OXYGEN SATURATION: 100 % | WEIGHT: 147.06 LBS | RESPIRATION RATE: 20 BRPM | TEMPERATURE: 97 F | SYSTOLIC BLOOD PRESSURE: 101 MMHG

## 2023-05-26 LAB — PSYCHE PATHOLOGY RESULT: NORMAL

## 2023-06-08 NOTE — DISCHARGE SUMMARY
[facility] Surgical - Periop Services  Obstetrics & Gynecology  Discharge Summary    Patient Name: William Multani  MRN: [id number]  Admission Date: 5/24/2023  Hospital Length of Stay: 0 days  Discharge Date and Time: 5/24/2023  2:54 PM  Attending Physician: No att. providers found   Discharging Provider: Blayne Parra MD  Primary Care Provider: Primary Doctor No    HPI:  No notes on file    Hospital Course:  No notes on file    Goals of Care Treatment Preferences:  Code Status: Full Code      Procedure(s) (LRB):  HYSTEROSCOPY operative (N/A)  REMOVAL, INTRAUTERINE DEVICE (N/A)  EXCISION, WART (N/A)         Significant Diagnostic Studies: Labs: All labs within the past 24 hours have been reviewed    Pending Diagnostic Studies:       None          There are no hospital problems to display for this patient.       Discharged Condition: stable    Disposition: Home or Self Care    Follow Up:    Patient Instructions:   No discharge procedures on file.  Medications:  Reconciled Home Medications:      Medication List        ASK your doctor about these medications      ibuprofen 800 MG tablet  Commonly known as: ADVIL,MOTRIN  Take 1 tablet (800 mg total) by mouth 3 (three) times daily as needed for Pain.              Blayne Parra MD  Obstetrics & Gynecology  [facility] Surgical - Peri Services

## 2023-06-15 ENCOUNTER — INFUSION (OUTPATIENT)
Dept: INFUSION THERAPY | Facility: HOSPITAL | Age: 23
End: 2023-06-15
Attending: OBSTETRICS & GYNECOLOGY
Payer: MEDICAID

## 2023-06-15 VITALS
HEIGHT: 63 IN | RESPIRATION RATE: 18 BRPM | DIASTOLIC BLOOD PRESSURE: 67 MMHG | SYSTOLIC BLOOD PRESSURE: 118 MMHG | BODY MASS INDEX: 26.21 KG/M2 | OXYGEN SATURATION: 100 % | TEMPERATURE: 98 F | HEART RATE: 83 BPM | WEIGHT: 147.94 LBS

## 2023-06-15 DIAGNOSIS — Z23 NEED FOR HPV VACCINE: Primary | ICD-10-CM

## 2023-06-15 PROCEDURE — 63600175 PHARM REV CODE 636 W HCPCS: Performed by: OBSTETRICS & GYNECOLOGY

## 2023-06-15 PROCEDURE — 90651 9VHPV VACCINE 2/3 DOSE IM: CPT | Performed by: OBSTETRICS & GYNECOLOGY

## 2023-06-15 PROCEDURE — 90471 IMMUNIZATION ADMIN: CPT | Performed by: OBSTETRICS & GYNECOLOGY

## 2023-06-15 PROCEDURE — 96372 THER/PROPH/DIAG INJ SC/IM: CPT

## 2023-06-15 RX ADMIN — HUMAN PAPILLOMAVIRUS 9-VALENT VACCINE, RECOMBINANT 0.5 ML: 30; 40; 60; 40; 20; 20; 20; 20; 20 INJECTION, SUSPENSION INTRAMUSCULAR at 11:06

## 2023-08-07 ENCOUNTER — HOSPITAL ENCOUNTER (OUTPATIENT)
Dept: RADIOLOGY | Facility: HOSPITAL | Age: 23
Discharge: HOME | End: 2023-08-07
Attending: OBSTETRICS & GYNECOLOGY
Payer: MEDICAID

## 2023-08-07 DIAGNOSIS — Z09 FOLLOW-UP EXAM: ICD-10-CM

## 2023-08-07 DIAGNOSIS — N83.209 CYST OF OVARY, UNSPECIFIED LATERALITY: ICD-10-CM

## 2023-08-07 DIAGNOSIS — R10.2 PELVIC PAIN: ICD-10-CM

## 2023-08-07 PROCEDURE — 76856 US EXAM PELVIC COMPLETE: CPT | Mod: TC

## 2024-12-06 ENCOUNTER — HOSPITAL ENCOUNTER (EMERGENCY)
Facility: HOSPITAL | Age: 24
Discharge: HOME | End: 2024-12-06
Attending: INTERNAL MEDICINE
Payer: MEDICAID

## 2024-12-06 VITALS
WEIGHT: 142 LBS | DIASTOLIC BLOOD PRESSURE: 73 MMHG | BODY MASS INDEX: 27.88 KG/M2 | OXYGEN SATURATION: 100 % | TEMPERATURE: 98 F | SYSTOLIC BLOOD PRESSURE: 122 MMHG | HEIGHT: 60 IN | RESPIRATION RATE: 18 BRPM | HEART RATE: 89 BPM

## 2024-12-06 DIAGNOSIS — R10.9 ABDOMINAL PAIN AFFECTING PREGNANCY: ICD-10-CM

## 2024-12-06 DIAGNOSIS — Z34.90 INTRAUTERINE PREGNANCY: Primary | ICD-10-CM

## 2024-12-06 DIAGNOSIS — O26.899 ABDOMINAL PAIN AFFECTING PREGNANCY: ICD-10-CM

## 2024-12-06 LAB
ALBUMIN SERPL-MCNC: 4.3 G/DL (ref 3.5–5)
ALBUMIN/GLOB SERPL: 1.4 RATIO (ref 1.1–2)
ALP SERPL-CCNC: 69 UNIT/L (ref 40–150)
ALT SERPL-CCNC: 12 UNIT/L (ref 0–55)
ANION GAP SERPL CALC-SCNC: 10 MEQ/L
AST SERPL-CCNC: 14 UNIT/L (ref 5–34)
B-HCG FREE SERPL-ACNC: 6820.48 MIU/ML
BACTERIA #/AREA URNS AUTO: ABNORMAL /HPF
BASOPHILS # BLD AUTO: 0.02 X10(3)/MCL
BASOPHILS NFR BLD AUTO: 0.2 %
BILIRUB SERPL-MCNC: 0.3 MG/DL
BILIRUB UR QL STRIP.AUTO: NEGATIVE
BUN SERPL-MCNC: 9.8 MG/DL (ref 7–18.7)
CALCIUM SERPL-MCNC: 9.3 MG/DL (ref 8.4–10.2)
CHLORIDE SERPL-SCNC: 106 MMOL/L (ref 98–107)
CLARITY UR: CLEAR
CO2 SERPL-SCNC: 20 MMOL/L (ref 22–29)
COLOR UR AUTO: ABNORMAL
CREAT SERPL-MCNC: 0.68 MG/DL (ref 0.55–1.02)
CREAT/UREA NIT SERPL: 14
EOSINOPHIL # BLD AUTO: 0.09 X10(3)/MCL (ref 0–0.9)
EOSINOPHIL NFR BLD AUTO: 0.8 %
ERYTHROCYTE [DISTWIDTH] IN BLOOD BY AUTOMATED COUNT: 12.1 % (ref 11.5–17)
GFR SERPLBLD CREATININE-BSD FMLA CKD-EPI: >60 ML/MIN/1.73/M2
GLOBULIN SER-MCNC: 3.1 GM/DL (ref 2.4–3.5)
GLUCOSE SERPL-MCNC: 79 MG/DL (ref 74–100)
GLUCOSE UR QL STRIP: NORMAL
HCT VFR BLD AUTO: 37.6 % (ref 37–47)
HGB BLD-MCNC: 13.2 G/DL (ref 12–16)
HGB UR QL STRIP: NEGATIVE
HOLD SPECIMEN: NORMAL
HYALINE CASTS #/AREA URNS LPF: ABNORMAL /LPF
IMM GRANULOCYTES # BLD AUTO: 0.04 X10(3)/MCL (ref 0–0.04)
IMM GRANULOCYTES NFR BLD AUTO: 0.4 %
KETONES UR QL STRIP: ABNORMAL
LEUKOCYTE ESTERASE UR QL STRIP: NEGATIVE
LYMPHOCYTES # BLD AUTO: 2.94 X10(3)/MCL (ref 0.6–4.6)
LYMPHOCYTES NFR BLD AUTO: 26 %
MCH RBC QN AUTO: 29.9 PG (ref 27–31)
MCHC RBC AUTO-ENTMCNC: 35.1 G/DL (ref 33–36)
MCV RBC AUTO: 85.3 FL (ref 80–94)
MONOCYTES # BLD AUTO: 0.83 X10(3)/MCL (ref 0.1–1.3)
MONOCYTES NFR BLD AUTO: 7.3 %
MUCOUS THREADS URNS QL MICRO: ABNORMAL /LPF
NEUTROPHILS # BLD AUTO: 7.38 X10(3)/MCL (ref 2.1–9.2)
NEUTROPHILS NFR BLD AUTO: 65.3 %
NITRITE UR QL STRIP: NEGATIVE
NRBC BLD AUTO-RTO: 0 %
PH UR STRIP: 5.5 [PH]
PLATELET # BLD AUTO: 141 X10(3)/MCL (ref 130–400)
PMV BLD AUTO: 12.4 FL (ref 7.4–10.4)
POTASSIUM SERPL-SCNC: 3.6 MMOL/L (ref 3.5–5.1)
PROT SERPL-MCNC: 7.4 GM/DL (ref 6.4–8.3)
PROT UR QL STRIP: NEGATIVE
RBC # BLD AUTO: 4.41 X10(6)/MCL (ref 4.2–5.4)
RBC #/AREA URNS AUTO: ABNORMAL /HPF
SODIUM SERPL-SCNC: 136 MMOL/L (ref 136–145)
SP GR UR STRIP.AUTO: 1.01 (ref 1–1.03)
SQUAMOUS #/AREA URNS LPF: ABNORMAL /HPF
UROBILINOGEN UR STRIP-ACNC: NORMAL
WBC # BLD AUTO: 11.3 X10(3)/MCL (ref 4.5–11.5)
WBC #/AREA URNS AUTO: ABNORMAL /HPF

## 2024-12-06 PROCEDURE — 99284 EMERGENCY DEPT VISIT MOD MDM: CPT | Mod: 25

## 2024-12-06 PROCEDURE — 84702 CHORIONIC GONADOTROPIN TEST: CPT

## 2024-12-06 PROCEDURE — 81001 URINALYSIS AUTO W/SCOPE: CPT

## 2024-12-06 PROCEDURE — 85025 COMPLETE CBC W/AUTO DIFF WBC: CPT

## 2024-12-06 PROCEDURE — 80053 COMPREHEN METABOLIC PANEL: CPT

## 2024-12-06 RX ORDER — FAMOTIDINE 20 MG
1 TABLET ORAL DAILY
Qty: 30 TABLET | Refills: 3 | Status: SHIPPED | OUTPATIENT
Start: 2024-12-06 | End: 2025-12-06

## 2024-12-06 RX ORDER — ACETAMINOPHEN 500 MG
500 TABLET ORAL EVERY 6 HOURS PRN
Qty: 30 TABLET | Refills: 0 | Status: SHIPPED | OUTPATIENT
Start: 2024-12-06

## 2024-12-06 NOTE — Clinical Note
"William Ambrosiodick" Rangel was seen and treated in our emergency department on 12/6/2024.  She may return to work on 12/09/2024.  Patient not to lift weight exceeding 25 lbs throughout pregnancy.      If you have any questions or concerns, please don't hesitate to call.      Betty Escobedo PA"

## 2024-12-07 NOTE — ED PROVIDER NOTES
Encounter Date: 2024       History     Chief Complaint   Patient presents with    Abdominal Pain     Pt presents to ER c/o lower abd pain for several days. Tested today at outside clinic and confirmed pregnancy. LMP 10/15. Denies bleeding or discharge.      A 23 y.o. female  patient presents to the ED with lower abdominal pain and positive pregnancy test today. The onset is 1 week ago. Location is pelvic/bilateral lower abdomen. It is characterized as cramping. Associated symptoms: none. It is constant. Alleviating factors: none. Aggravating factors: none. Severity is Moderate. Risk factors include pregnancy. Denies N/V/D, constipation (LBM today), vaginal bleeding, vaginal discharge, changes in urine, SOB, CP, fever, chills. LMP 10/15      The history is provided by the patient. The history is limited by a language barrier. A  was used.     Review of patient's allergies indicates:  No Known Allergies  Past Medical History:   Diagnosis Date    Childhood asthma     Excessive vaginal bleeding      Past Surgical History:   Procedure Laterality Date    EXCISION OF VERRUCA N/A 2023    Procedure: EXCISION, WART;  Surgeon: Blayne Parra MD;  Location: [facility] OR;  Service: OB/GYN;  Laterality: N/A;    HYSTEROSCOPY N/A 2023    Procedure: HYSTEROSCOPY operative;  Surgeon: Blayne Parra MD;  Location: [facility] OR;  Service: OB/GYN;  Laterality: N/A;    REMOVAL OF INTRAUTERINE DEVICE (IUD) N/A 2023    Procedure: REMOVAL, INTRAUTERINE DEVICE;  Surgeon: Blayne Parra MD;  Location: [facility] OR;  Service: OB/GYN;  Laterality: N/A;    TONSILLECTOMY AND ADENOIDECTOMY       Family History   Problem Relation Name Age of Onset    No Known Problems Mother      No Known Problems Father      No Known Problems Sister      No Known Problems Brother       Social History     Tobacco Use    Smoking status: Never    Smokeless tobacco: Never   Substance Use Topics    Alcohol use: Not Currently      Comment: occasionally    Drug use: Never     Review of Systems   Constitutional:  Negative for chills and fever.   Eyes:  Negative for visual disturbance.   Respiratory:  Negative for shortness of breath.    Cardiovascular:  Negative for chest pain and leg swelling.   Gastrointestinal:  Positive for abdominal pain. Negative for abdominal distention, blood in stool, constipation, diarrhea, nausea and vomiting.   Genitourinary:  Negative for decreased urine volume, difficulty urinating, dysuria, vaginal bleeding, vaginal discharge and vaginal pain.   Musculoskeletal:  Negative for arthralgias.   Skin:  Negative for color change and rash.   Neurological:  Negative for weakness and headaches.   Hematological:  Does not bruise/bleed easily.   Psychiatric/Behavioral:  Negative for confusion.    All other systems reviewed and are negative.      Physical Exam     Initial Vitals   BP Pulse Resp Temp SpO2   12/06/24 1743 12/06/24 1743 12/06/24 1743 12/06/24 1745 12/06/24 1743   120/77 96 18 98 °F (36.7 °C) 96 %      MAP       --                Physical Exam    Nursing note and vitals reviewed.  Constitutional: She appears well-developed and well-nourished.   HENT:   Head: Normocephalic and atraumatic.   Right Ear: External ear normal.   Left Ear: External ear normal.   Nose: Nose normal. Mouth/Throat: Oropharynx is clear and moist.   Eyes: Conjunctivae and EOM are normal.   Neck: Neck supple.   Cardiovascular:  Normal rate, regular rhythm and normal heart sounds.     Exam reveals no gallop and no friction rub.       No murmur heard.  Pulmonary/Chest: Breath sounds normal. No respiratory distress. She has no wheezes. She has no rhonchi. She has no rales.   Abdominal: Abdomen is soft. Bowel sounds are normal. She exhibits no distension and no mass. There is generalized abdominal tenderness. There is no rebound and no guarding.   Musculoskeletal:      Cervical back: Neck supple.     Neurological: She is alert and oriented to  person, place, and time. GCS score is 15. GCS eye subscore is 4. GCS verbal subscore is 5. GCS motor subscore is 6.   Skin: Skin is warm and dry. Capillary refill takes less than 2 seconds.         ED Course   Procedures  Labs Reviewed   HCG, QUANTITATIVE - Abnormal       Result Value    Beta HCG Quant 6,820.48 (*)    COMPREHENSIVE METABOLIC PANEL - Abnormal    Sodium 136      Potassium 3.6      Chloride 106      CO2 20 (*)     Glucose 79      Blood Urea Nitrogen 9.8      Creatinine 0.68      Calcium 9.3      Protein Total 7.4      Albumin 4.3      Globulin 3.1      Albumin/Globulin Ratio 1.4      Bilirubin Total 0.3      ALP 69      ALT 12      AST 14      eGFR >60      Anion Gap 10.0      BUN/Creatinine Ratio 14     CBC WITH DIFFERENTIAL - Abnormal    WBC 11.30      RBC 4.41      Hgb 13.2      Hct 37.6      MCV 85.3      MCH 29.9      MCHC 35.1      RDW 12.1      Platelet 141      MPV 12.4 (*)     Neut % 65.3      Lymph % 26.0      Mono % 7.3      Eos % 0.8      Basophil % 0.2      Lymph # 2.94      Neut # 7.38      Mono # 0.83      Eos # 0.09      Baso # 0.02      IG# 0.04      IG% 0.4      NRBC% 0.0     URINALYSIS, REFLEX TO URINE CULTURE - Abnormal    Color, UA Light-Yellow      Appearance, UA Clear      Specific Gravity, UA 1.015      pH, UA 5.5      Protein, UA Negative      Glucose, UA Normal      Ketones, UA Trace (*)     Blood, UA Negative      Bilirubin, UA Negative      Urobilinogen, UA Normal      Nitrites, UA Negative      Leukocyte Esterase, UA Negative      RBC, UA 0-5      WBC, UA 0-5      Bacteria, UA Occasional (*)     Squamous Epithelial Cells, UA Trace (*)     Mucous, UA Trace (*)     Hyaline Casts, UA None Seen     CBC W/ AUTO DIFFERENTIAL    Narrative:     The following orders were created for panel order CBC auto differential.  Procedure                               Abnormality         Status                     ---------                               -----------         ------                      CBC with Differential[[id number]]       Abnormal            Final result                 Please view results for these tests on the individual orders.   EXTRA TUBES    Narrative:     The following orders were created for panel order EXTRA TUBES.  Procedure                               Abnormality         Status                     ---------                               -----------         ------                     Light Blue Top Hold[[id number]]                             Final result               Gold Top Hold[[id number]]                                   Final result               Pink Top Hold[[id number]]                                   Final result                 Please view results for these tests on the individual orders.   LIGHT BLUE TOP HOLD    Extra Tube Hold for add-ons.     GOLD TOP HOLD    Extra Tube Hold for add-ons.     PINK TOP HOLD    Extra Tube Hold for add-ons.            Imaging Results              US OB <14 Wks, TransAbd, Single Gestation (Final result)  Result time 12/06/24 20:01:54      Final result by Ishan Aparicio MD (12/06/24 20:01:54)                   Impression:      Suggests intrauterine gestation sac with a yolk sac at the uterine fundal area.  No fetal pole identified.  No heart rate detected at this time.  This may be due to early gestation.  Clinical correlation and serial short follow-up exams are recommended..      Electronically signed by: Ishan Aparicio  Date:    12/06/2024  Time:    20:01               Narrative:    EXAMINATION:  US OB <14 WEEKS TRANSABDOM, SINGLE GESTATION    CLINICAL HISTORY:  Other specified pregnancy related conditions, unspecified trimester    TECHNIQUE:  Multiple transabdominal transvaginal real-time images were performed of pelvis in various planes by the sonographer.    FINDINGS:  Uterus measures 7.5 x 3.0 x 4.1 cm.  There is circumscribed fluid collection suggestive of a gestation sac and measures 0.86 cm.  This is suggested to represent  a gestation sac with mean age of 5 weeks and 4 days.  Yolk sac is visualized. No fetal pole is definitely identified.  No heart rate detected at this time.  This may be secondary to very early gestation.  No pelvic free fluid.    Right ovary measures 2.2 x 2.8 x 2.3 cm and contains a thick wall cyst with peripheral vascularity which likely is a corpus luteum cyst and measures 2.2 x 2.0 x 1.6 cm.    Left ovary is not visualized.                                       Medications - No data to display  Medical Decision Making  A 23 y.o. female  patient presents to the ED with lower abdominal pain and positive pregnancy test today. The onset is 1 week ago. Location is pelvic/bilateral lower abdomen. It is characterized as cramping. Associated symptoms: none. It is constant. Alleviating factors: none. Aggravating factors: none. Severity is Moderate. Risk factors include pregnancy. Denies N/V/D, constipation (LBM today), vaginal bleeding, vaginal discharge, changes in urine, SOB, CP, fever, chills.       The history is provided by the patient. The history is limited by a language barrier. A  was used.     Pertinent findings: US shows intrauterine pregnancy and a right corpus luteum cyst.     Clinical diagnosis:  Abdominal pain affecting pregnancy  Intrauterine pregnancy (Primary)    Patient stable for DC with ED Prescriptions     Medication Sig Dispense Start Date End Date Auth. Provider    prenatal 21-iron fu-folic acid (PRENATAL COMPLETE) 14 mg iron- 400 mcg   Tab Take 1 tablet by mouth once daily. 30 tablet 2024   Betty Escobedo PA    acetaminophen (TYLENOL) 500 MG tablet Take 1 tablet (500 mg total) by   mouth every 6 (six) hours as needed for Pain. 30 tablet 2024 --   Betty Escobedo PA         Referrals: OB    Strict follow-up precautions given. Patient verbalizes understanding of treatment plan and ED return precautions.          Amount and/or Complexity of Data  Reviewed  Labs: ordered. Decision-making details documented in ED Course.  Radiology: ordered. Decision-making details documented in ED Course.    Risk  OTC drugs.  Risk Details: Given strict ED return precautions. I have spoken with the patient and/or caregivers. I have explained the patient's condition, diagnoses and treatment plan based on the information available to me at this time. I have answered the patient's and/or caregiver's questions and addressed any concerns. The patient and/or caregivers have as good an understanding of the patient's diagnosis, condition and treatment plan as can be expected at this point. The vital signs have been stable. The patient's condition is stable and appropriate for discharge from the emergency department.      The patient will pursue further outpatient evaluation with the primary care physician or other designated or consulting physician as outlined in the discharge instructions. The patient and/or caregivers are agreeable to this plan of care and follow-up instructions have been explained in detail. The patient and/or caregivers have received these instructions in written format and have expressed an understanding of the discharge instructions. The patient and/or caregivers are aware that any significant change in condition or worsening of symptoms should prompt an immediate return to this or the closest emergency department or a call to 911               Additional MDM:   Differential Diagnosis:   Other: The following diagnoses were also considered and will be evaluated: pregnancy, constipation and gastroenteritis.            ED Course as of 12/06/24 2031   Fri Dec 06, 2024   1924 NITRITE UA: Negative [AG]   1924 Leukocyte Esterase, UA: Negative [AG]   1925 Bacteria, UA(!): Occasional  Contaminant based on squamous epithelial [AG]   2007 US OB <14 Wks, TransAbd, Single Gestation  Impression:     Suggests intrauterine gestation sac with a yolk sac at the uterine fundal  area.  No fetal pole identified.  No heart rate detected at this time.  This may be due to early gestation.  Clinical correlation and serial short follow-up exams are recommended..   [AG]   2007 US OB <14 Wks, TransAbd, Single Gestation  Right ovary measures 2.2 x 2.8 x 2.3 cm and contains a thick wall cyst with peripheral vascularity which likely is a corpus luteum cyst and measures 2.2 x 2.0 x 1.6 cm.   [AG]      ED Course User Index  [AG] Betty Escobedo PA                           Clinical Impression:  Final diagnoses:  [O26.899, R10.9] Abdominal pain affecting pregnancy  [Z34.90] Intrauterine pregnancy (Primary)          ED Disposition Condition    Discharge Stable          ED Prescriptions       Medication Sig Dispense Start Date End Date Auth. Provider    prenatal 21-iron fu-folic acid (PRENATAL COMPLETE) 14 mg iron- 400 mcg Tab Take 1 tablet by mouth once daily. 30 tablet 12/6/2024 12/6/2025 Betty Escobedo PA    acetaminophen (TYLENOL) 500 MG tablet Take 1 tablet (500 mg total) by mouth every 6 (six) hours as needed for Pain. 30 tablet 12/6/2024 -- Betty Escobedo PA          Follow-up Information       Follow up With Specialties Details Why Contact Info Additional Information    [facility] - Emergency Dept Emergency Medicine Go to  If symptoms worsen, As needed [address] [facility] [address] [phone]     [facility] Family Medicine Family Medicine Schedule an appointment as soon as possible for a visit in 3 days For prenatal care and follow-up [address] [facility] [address] [phone] Family Medicine Clinic [address]    [facility] - Emergency Dept Emergency Medicine Go in 2 days For beta HCG recheck [facility] [facility] [address] [phone]              Betty Escobedo PA  12/06/24 2031

## 2024-12-07 NOTE — DISCHARGE INSTRUCTIONS
ULTRASOUND FINDINGS:  Uterus measures 7.5 x 3.0 x 4.1 cm.  There is circumscribed fluid collection suggestive of a gestation sac and measures 0.86 cm.  This is suggested to represent a gestation sac with mean age of 5 weeks and 4 days.  Yolk sac is visualized. No fetal pole is definitely identified.  No heart rate detected at this time.  This may be secondary to very early gestation.  No pelvic free fluid.     Right ovary measures 2.2 x 2.8 x 2.3 cm and contains a thick wall cyst with peripheral vascularity which likely is a corpus luteum cyst and measures 2.2 x 2.0 x 1.6 cm.     Left ovary is not visualized.      FOLLOW-UP:  It is important that you follow up with your primary care provider or specialist if indicated for further evaluation, workup, and treatment as necessary. The exam and treatment you received in Emergency Department was for an urgent problem and NOT INTENDED AS COMPLETE CARE. It is important that you FOLLOW UP with a doctor for ongoing care. If your symptoms become WORSE or you DO NOT IMPROVE and you are unable to reach your health care provider, you should RETURN to the Emergency Department. The Emergency Department provider has provided a PRELIMINARY INTERPRETATION of all your studies. A final interpretation may be done after you are discharged. If a change in your diagnosis or treatment is needed WE WILL CONTACT YOU. It is critical that we have a CURRENT PHONE NUMBER FOR YOU.

## 2025-05-25 ENCOUNTER — HOSPITAL ENCOUNTER (EMERGENCY)
Facility: HOSPITAL | Age: 25
Discharge: HOME | End: 2025-05-25
Attending: FAMILY MEDICINE
Payer: MEDICAID

## 2025-05-25 VITALS
TEMPERATURE: 99 F | DIASTOLIC BLOOD PRESSURE: 68 MMHG | HEART RATE: 102 BPM | OXYGEN SATURATION: 99 % | RESPIRATION RATE: 16 BRPM | HEIGHT: 62 IN | WEIGHT: 168 LBS | BODY MASS INDEX: 30.91 KG/M2 | SYSTOLIC BLOOD PRESSURE: 112 MMHG

## 2025-05-25 DIAGNOSIS — B96.89 ACUTE BACTERIAL SINUSITIS: ICD-10-CM

## 2025-05-25 DIAGNOSIS — J20.9 ACUTE BRONCHITIS, UNSPECIFIED ORGANISM: Primary | ICD-10-CM

## 2025-05-25 DIAGNOSIS — J01.90 ACUTE BACTERIAL SINUSITIS: ICD-10-CM

## 2025-05-25 DIAGNOSIS — J02.9 PHARYNGITIS, UNSPECIFIED ETIOLOGY: ICD-10-CM

## 2025-05-25 LAB
FLUAV AG UPPER RESP QL IA.RAPID: NOT DETECTED
FLUBV AG UPPER RESP QL IA.RAPID: NOT DETECTED
RSV A 5' UTR RNA NPH QL NAA+PROBE: NOT DETECTED
SARS-COV-2 RNA RESP QL NAA+PROBE: NOT DETECTED

## 2025-05-25 PROCEDURE — 0241U COVID/RSV/FLU A&B PCR: CPT | Performed by: FAMILY MEDICINE

## 2025-05-25 PROCEDURE — 99284 EMERGENCY DEPT VISIT MOD MDM: CPT

## 2025-05-25 RX ORDER — AMOXICILLIN 875 MG/1
875 TABLET, COATED ORAL 2 TIMES DAILY
Qty: 14 TABLET | Refills: 0 | Status: SHIPPED | OUTPATIENT
Start: 2025-05-25

## 2025-05-25 RX ORDER — PREDNISONE 20 MG/1
20 TABLET ORAL DAILY
Qty: 5 TABLET | Refills: 0 | Status: SHIPPED | OUTPATIENT
Start: 2025-05-25 | End: 2025-05-30

## (undated) DEVICE — DRAPE STERI INSTRUMENT 1018

## (undated) DEVICE — TOWEL OR DISP STRL BLUE 4/PK

## (undated) DEVICE — DRAPE UNDER BUTTOCKS SUC PORT

## (undated) DEVICE — PAD PREP CUFFED NS 24X48IN

## (undated) DEVICE — SUPPORT ULNA NERVE PROTECTOR

## (undated) DEVICE — PAD CURITY MATERNITY PERI

## (undated) DEVICE — Device

## (undated) DEVICE — HANDLE DEVON RIGID OR LIGHT

## (undated) DEVICE — LUBRICANT SURGILUBE 2 OZ

## (undated) DEVICE — GOWN X-LG STERILE BACK

## (undated) DEVICE — TRAY SKIN SCRUB WET PREMIUM

## (undated) DEVICE — DRESSING TELFA N ADH 3X8IN

## (undated) DEVICE — DRAPE LEGGINGS CUFF 33X51IN

## (undated) DEVICE — GLOVE PROTEXIS LTX MICRO 8

## (undated) DEVICE — SOL NORMAL USPCA 0.9%

## (undated) DEVICE — HANDPIECE ARGYLE YANKAUER 34FR

## (undated) DEVICE — SEAL LENS SCOPE MYOSURE

## (undated) DEVICE — GLOVE PROTEXIS LTX MICRO 6.5

## (undated) DEVICE — PENCIL ELECSURG ROCKER 15FT

## (undated) DEVICE — SOL NACL IRR 1000ML BTL

## (undated) DEVICE — IRRIGATION SET Y-TYPE TUR/BLAD